# Patient Record
Sex: FEMALE | Race: WHITE | NOT HISPANIC OR LATINO | Employment: FULL TIME | URBAN - METROPOLITAN AREA
[De-identification: names, ages, dates, MRNs, and addresses within clinical notes are randomized per-mention and may not be internally consistent; named-entity substitution may affect disease eponyms.]

---

## 2017-08-25 ENCOUNTER — TELEPHONE (OUTPATIENT)
Dept: NEUROSURGERY | Facility: CLINIC | Age: 42
End: 2017-08-25

## 2017-08-25 NOTE — TELEPHONE ENCOUNTER
----- Message from Jon Tello sent at 8/25/2017 11:44 AM CDT -----  Contact: Patient @ [phone]  Patient is calling to schedule a f/u appt she's having pain where the shunt is and her blood pressure keeps dropping, pls call

## 2017-08-29 ENCOUNTER — TELEPHONE (OUTPATIENT)
Dept: NEUROSURGERY | Facility: CLINIC | Age: 42
End: 2017-08-29

## 2017-08-29 NOTE — TELEPHONE ENCOUNTER
Patient called to say she lives 2 hours away and cant make it today d/t weather. I advised her of the soonest available appt of 09/14/2017 1240 with catrachito hernandez. I had made this appt so fast bc she is having shunt pain with pressure dropping to 70/50s. Im not sure if it is shunt related but she said her cardiologist said it is related to the shunt. I advised her if she feels bad, dizzy or faint then proceed to the ED in the meantime

## 2017-08-30 ENCOUNTER — TELEPHONE (OUTPATIENT)
Dept: NEUROSURGERY | Facility: CLINIC | Age: 42
End: 2017-08-30

## 2017-08-30 NOTE — TELEPHONE ENCOUNTER
----- Message from Mary Olea sent at 8/29/2017  8:13 AM CDT -----  Contact: Patient [phone]  Patient is calling to ask nurse if there is another appt date available and to also ask a few questions about her shunt. Please call

## 2017-09-14 ENCOUNTER — OFFICE VISIT (OUTPATIENT)
Dept: NEUROSURGERY | Facility: CLINIC | Age: 42
End: 2017-09-14
Payer: MEDICAID

## 2017-09-14 VITALS
WEIGHT: 115 LBS | HEIGHT: 60 IN | TEMPERATURE: 98 F | DIASTOLIC BLOOD PRESSURE: 75 MMHG | SYSTOLIC BLOOD PRESSURE: 108 MMHG | BODY MASS INDEX: 22.58 KG/M2 | HEART RATE: 95 BPM

## 2017-09-14 DIAGNOSIS — G93.89 CEREBRAL VENTRICULOMEGALY: Primary | ICD-10-CM

## 2017-09-14 DIAGNOSIS — R51.9 CHRONIC NONINTRACTABLE HEADACHE, UNSPECIFIED HEADACHE TYPE: Primary | ICD-10-CM

## 2017-09-14 DIAGNOSIS — G89.29 CHRONIC NONINTRACTABLE HEADACHE, UNSPECIFIED HEADACHE TYPE: Primary | ICD-10-CM

## 2017-09-14 PROCEDURE — 99999 PR PBB SHADOW E&M-EST. PATIENT-LVL III: CPT | Mod: PBBFAC,,, | Performed by: PHYSICIAN ASSISTANT

## 2017-09-14 PROCEDURE — 99213 OFFICE O/P EST LOW 20 MIN: CPT | Mod: PBBFAC | Performed by: PHYSICIAN ASSISTANT

## 2017-09-14 PROCEDURE — 99213 OFFICE O/P EST LOW 20 MIN: CPT | Mod: S$PBB,,, | Performed by: PHYSICIAN ASSISTANT

## 2017-09-14 RX ORDER — IBUPROFEN 800 MG/1
800 TABLET ORAL
COMMUNITY
Start: 2017-08-10

## 2017-09-14 RX ORDER — CLONAZEPAM 0.5 MG/1
0.25 TABLET ORAL
COMMUNITY
Start: 2015-12-30 | End: 2019-08-13

## 2017-09-14 RX ORDER — ESTRADIOL 0.25 MG/.25G
GEL TOPICAL
Refills: 0 | COMMUNITY
Start: 2017-07-26

## 2017-09-14 NOTE — PROGRESS NOTES
[facility]  Neurosurgery    SUBJECTIVE:     History of Present Illness:  Patient is a 42 y.o. female with PMHx VPS originally placed at [facility] in  for ventriculmegaly by Dr. Mcintosh, who presents for continued neurosurgical follow up.  She was last seen by Dr. Pennington in clinic on 3/3/16 and her Codman Hakim shunt was adjusted to 170.  She has a history of migraines & tension HAs as well and is followed by Dr. Bolanos with neurology in [place].  She reports that approximately 2 weeks ago, she began having significant tenderness at her shunt site, but denies erythema, fever, drainage.  She also began having severe headaches, which prompted her to go to the ED at [place].  A CTH & shunt series were done, which revealed no changes or abnormalities.  The severe headaches have now resolved and she continues to have her normal headaches.  She has nausea, but has had this for years & it is unchanged.  She has no vomiting.  She does have intermittent paresthesias in all limbs and this has been evaluated by her neurologist with [facility], but MS was ruled out.  She denies any vision changes.      Review of patient's allergies indicates:   Allergen Reactions    Butorphanol tartrate     Metoclopramide hcl     Prochlorperazine edisylate     Promethazine     Hydrocodone-acetaminophen Nausea And Vomiting and Rash       History reviewed. No pertinent past medical history.  Past Surgical History:   Procedure Laterality Date    APPENDECTOMY       SECTION      CHOLECYSTECTOMY      HYSTERECTOMY  2016    VENTRICULOPERITONEAL SHUNT       History reviewed. No pertinent family history.  Social History   Substance Use Topics    Smoking status: Never Smoker    Smokeless tobacco: Never Used    Alcohol use No        Review of Systems:  Constitutional: no fever or chills  Eyes: no visual changes  ENT: no nasal congestion or sore throat  Respiratory: no cough or shortness of breath  Cardiovascular: no chest  pain or palpitations  Gastrointestinal: no nausea or vomiting, tolerating diet  Genitourinary: no hematuria or dysuria  Integument/Breast: no rash or pruritis  Hematologic/Lymphatic: no easy bruising or lymphadenopathy  Musculoskeletal: no arthralgias or myalgias  Neurological: no seizures or tremors, positive for headaches  Behavioral/Psych: no auditory or visual hallucinations  Endocrine: no heat or cold intolerance    OBJECTIVE:     Vital Signs (Most Recent):  Temp: 98.4 °F (36.9 °C) (09/14/17 1240)  Pulse: 95 (09/14/17 1240)  BP: 108/75 (09/14/17 1240)    Physical Exam:  General: well developed, well nourished, no distress  Head: normocephalic, atraumatic  Neurologic: Alert and oriented. Thought content appropriate  GCS: Motor: 6/Verbal: 5/Eyes: 4 GCS Total: 15  Mental Status: Awake, Alert, Oriented x 4  Language: No aphasia  Speech: No dysarthria  Cranial nerves: face symmetric, tongue midline, CN II-XII grossly intact.   Eyes: pupils equal, round, reactive to light with accommodation, EOMI. Unable to appreciate optic disc. Red reflex intact bilaterally.   Pulmonary: normal respirations, not labored, no accessory muscles used  Abdomen: soft, non-distended, not tender to palpation  Sensory: intact to light touch throughout  Motor Strength: Moves all extremities spontaneously with good tone.  Full strength upper and lower extremities. No abnormal movements seen.     Strength  Deltoids Triceps Biceps Wrist Extension Wrist Flexion Hand    Upper: R 5/5 5/5 5/5 5/5 5/5 5/5    L 5/5 5/5 5/5 5/5 5/5 5/5     Iliopsoas Quadriceps Knee  Flexion Tibialis  anterior Gastro- cnemius EHL   Lower: R 5/5 5/5 5/5 5/5 5/5 5/5    L 5/5 5/5 5/5 5/5 5/5 5/5     DTR's - 2 + and symmetric triceps, biceps, brachioradialis, patellar, & achilles  Pronator Drift: no drift noted  Finger-to-nose: Intact bilaterally  Moreno: absent  Clonus: absent  Babinski: absent  Pulses: 2+ and symmetric radial and dorsalis pedis  Skin: warm, dry and  intact, no rashes  Shunt reservoir pumps and refills slowly    Diagnostic Results:  I personally reviewed [facility] CTH & Shunt series    ASSESSMENT/PLAN:     Patient is a 42 y.o. female with PMHx VPS originally placed at [facility] in 2003 for ventriculmegaly by Dr. Mcintosh, who presents for continued neurosurgical follow up of headaches  -Neurologically intact  -No evidence of shunt infection  -[facility] imaging with stable placement of shunt and small ventricles, shunt series shows no concern for discontinuation or kinking of tubing  -Adjusted shunt today from 170 to 190  -Will have patient follow up in 2 weeks to evaluate if any changes in headaches  -Encouraged patient to call with any new or worsening symptoms in the interim    Please feel free to call with any further questions    Latha Muro PA-C  Neurosurgery  [phone]

## 2018-01-10 ENCOUNTER — TELEPHONE (OUTPATIENT)
Dept: NEUROSURGERY | Facility: CLINIC | Age: 43
End: 2018-01-10

## 2018-01-10 NOTE — TELEPHONE ENCOUNTER
----- Message from Jon Tello sent at 1/10/2018  2:17 PM CST -----  Contact: Patient @ [phone]  Patient is calling to schedule a f/u with Dr. Pennington not the PA to check her shunt, she had a shunt series and CT done within a month,  pls call

## 2018-01-16 ENCOUNTER — OFFICE VISIT (OUTPATIENT)
Dept: NEUROSURGERY | Facility: CLINIC | Age: 43
End: 2018-01-16
Payer: MEDICAID

## 2018-01-16 VITALS
WEIGHT: 118.88 LBS | BODY MASS INDEX: 23.34 KG/M2 | HEART RATE: 76 BPM | DIASTOLIC BLOOD PRESSURE: 78 MMHG | SYSTOLIC BLOOD PRESSURE: 121 MMHG | HEIGHT: 60 IN | TEMPERATURE: 98 F

## 2018-01-16 DIAGNOSIS — R42 VERTIGO: ICD-10-CM

## 2018-01-16 DIAGNOSIS — G91.9 HYDROCEPHALUS: ICD-10-CM

## 2018-01-16 DIAGNOSIS — Z98.2 S/P VP SHUNT: ICD-10-CM

## 2018-01-16 PROCEDURE — 99999 PR PBB SHADOW E&M-EST. PATIENT-LVL III: CPT | Mod: PBBFAC,,, | Performed by: PHYSICIAN ASSISTANT

## 2018-01-16 PROCEDURE — 99213 OFFICE O/P EST LOW 20 MIN: CPT | Mod: PBBFAC | Performed by: PHYSICIAN ASSISTANT

## 2018-01-16 PROCEDURE — 99214 OFFICE O/P EST MOD 30 MIN: CPT | Mod: S$PBB,,, | Performed by: PHYSICIAN ASSISTANT

## 2018-01-16 RX ORDER — ONDANSETRON 8 MG/1
8 TABLET, ORALLY DISINTEGRATING ORAL 2 TIMES DAILY
Refills: 0 | COMMUNITY
Start: 2017-11-03 | End: 2019-08-13

## 2018-01-16 NOTE — LETTER
January 16, 2018      Adan Kim, LARRY  [address] [address]  [address]  [facility]  [place] [address]           [facility] - Neurosurgery 7th Fl  [address]  [facility] [address]  Phone: [phone]          Patient: Esther Dominique   MR Number: [id number]   YOB: 1975   Date of Visit: 1/16/2018       Dear Adan Kim:    Thank you for referring Esther Dominique to me for evaluation. Attached you will find relevant portions of my assessment and plan of care.    If you have questions, please do not hesitate to call me. I look forward to following Esther Dominique along with you.    Sincerely,    Chantell Castañeda PA-C    Enclosure  CC:  No Recipients    If you would like to receive this communication electronically, please contact [email or web address] or [phone] to request more information on [employer] access.    For providers and/or their staff who would like to refer a patient to [facility], please contact us through our one-stop-shop provider referral line, Caro Davis, at [phone].    If you feel you have received this communication in error or would no longer like to receive these types of communications, please e-mail [email or web address]

## 2018-01-16 NOTE — PROGRESS NOTES
[facility] - Neurosurgery 7th Fl  Neurosurgery    SUBJECTIVE:     History of Present Illness:  Esther Dominique is a 42 y.o. female with PMHx VPS originally placed at [facility] in  for ventriculmegaly by Dr. Mcintosh, who presents for continued neurosurgical follow up. She was last seen by Latha Muro PA-C 2017, at which time her shunt was dialed from 170 to 190 for continued HAs. Today, she states she has done well at this setting, with improvement in her HAs. However, she is concerned that her shunt is draining externally. She reports a damp feeling at her shunt site occasionally. She also states that she can feel her shunt draining intracranially. Additionally, she reports decreased balance when standing still with eyes closed. She denies worsened HAs, n/v, fever, chills. She does have a history of chronic sinusitis and has recently finished a course of abx for sinus infection.     Review of patient's allergies indicates:   Allergen Reactions    Butorphanol tartrate     Metoclopramide hcl     Prochlorperazine edisylate     Promethazine     Hydrocodone-acetaminophen Nausea And Vomiting and Rash       No past medical history on file.    Past Surgical History:   Procedure Laterality Date    APPENDECTOMY       SECTION      CHOLECYSTECTOMY      HYSTERECTOMY  2016    VENTRICULOPERITONEAL SHUNT          No family history on file.    Social History     Social History    Marital status: Single     Spouse name: N/A    Number of children: N/A    Years of education: N/A     Occupational History    Not on file.     Social History Main Topics    Smoking status: Never Smoker    Smokeless tobacco: Never Used    Alcohol use No    Drug use: No    Sexual activity: Yes     Partners: Male     Other Topics Concern    Not on file     Social History Narrative    No narrative on file       Review of Systems:  Constitutional: no fever, chills or night sweats. No changes in weight   Eyes: no visual  changes   ENT: no nasal congestion or sore throat   Respiratory: no cough or shortness of breath   Cardiovascular: no chest pain or palpitations   Gastrointestinal: no nausea or vomiting   Genitourinary: no hematuria or dysuria   Integument/Breast: no rash or pruritis   Hematologic/Lymphatic: no easy bruising or lymphadenopathy   Musculoskeletal: no arthralgias or myalgias.   Neurological: no seizures or tremors   Behavioral/Psych: no auditory or visual hallucinations   Endocrine: no heat or cold intolerance     OBJECTIVE:     Vital Signs (Most Recent):  Vitals:    01/16/18 1111   BP: 121/78   Pulse: 76   Temp: 97.7 °F (36.5 °C)   Weight: 53.9 kg (118 lb 14.4 oz)   Height: 5' (1.524 m)       Physical Exam:  General: well developed, well nourished, no distress.   Head: normocephalic, atraumatic  Neurologic: Alert and oriented. Thought content appropriate.  GCS: Motor: 6/Verbal: 5/Eyes: 4 GCS Total: 15  Mental Status: Awake, Alert, Oriented x 4  Language: No aphasia  Speech: No dysarthria  Cranial nerves: face symmetric, tongue midline, CN II-XII grossly intact.   Eyes: pupils equal, round, reactive to light with accomodation, EOMI.  Pulmonary: normal respirations, no signs of respiratory distress  Abdomen: soft, non-distended, not tender to palpation  Sensory: intact to light touch throughout    Motor Strength: Moves all extremities spontaneously with good tone.  Full strength upper and lower extremities. No abnormal movements seen.     DTR's: 2 + and symmetric in UE and LE  Pronator Drift: no drift noted  Finger-to-nose: Intact bilaterally  Moreno: absent  Clonus: absent  Babinski: absent  Pulses: 2+ and symmetric radial and dorsalis pedis.  Skin: Skin is warm, dry and intact.  Gait: normal   + Romberg  No evidence of skin breakdown at shunt site. Unable to express drainage. VPS pumps, refills.    Diagnostic Results:  CT head and shunt series reviewed and show appropriate placement of proximal and distal catheters  without acute complication or discontinuity.    ASSESSMENT/PLAN:     Esther Dominique is a 42 y.o. female s/p VPS in 2003 who presents for concerns of VPS malfunction. I see no radiographic or clinical evidence for VPS malfunction at this time. I believe her dizziness and sensation of drainage may be attributed to sinusitis vs vertigo, and have instructed her to follow up with her PCP for this. She knows to contact me with any new or worsening symptoms. Otherwise, she may follow up in one year.      Chantell Castañeda PA-C  Neurosurgery

## 2019-04-29 ENCOUNTER — TELEPHONE (OUTPATIENT)
Dept: NEUROSURGERY | Facility: CLINIC | Age: 44
End: 2019-04-29

## 2019-04-29 NOTE — TELEPHONE ENCOUNTER
Wilner Pt about getting an appointment early the next available was 08/13/19@3:30pm Pt stated she will try to go to her NP  And see if she can be seen before the available date for  and scans for appointment I ask her to call the office if she gets Imaging before  's F/U

## 2019-06-03 ENCOUNTER — TELEPHONE (OUTPATIENT)
Dept: NEUROSURGERY | Facility: CLINIC | Age: 44
End: 2019-06-03

## 2019-06-03 NOTE — TELEPHONE ENCOUNTER
Sw the Pt about her wanting a sooner appt. I explained to the patient that her date and time would be the next available on the 8/13/19 also instructed the patient if she feels it is very severe she needs to go the ER foe evail asap! Patient understood the stated

## 2019-08-13 ENCOUNTER — OFFICE VISIT (OUTPATIENT)
Dept: NEUROSURGERY | Facility: CLINIC | Age: 44
End: 2019-08-13

## 2019-08-13 ENCOUNTER — TELEPHONE (OUTPATIENT)
Dept: NEUROSURGERY | Facility: CLINIC | Age: 44
End: 2019-08-13

## 2019-08-13 VITALS
SYSTOLIC BLOOD PRESSURE: 124 MMHG | WEIGHT: 118.31 LBS | HEIGHT: 61 IN | DIASTOLIC BLOOD PRESSURE: 60 MMHG | HEART RATE: 92 BPM | BODY MASS INDEX: 22.34 KG/M2

## 2019-08-13 DIAGNOSIS — Z98.2 S/P VP SHUNT: Primary | ICD-10-CM

## 2019-08-13 PROCEDURE — 99214 PR OFFICE/OUTPT VISIT, EST, LEVL IV, 30-39 MIN: ICD-10-PCS | Mod: S$PBB,,, | Performed by: NEUROLOGICAL SURGERY

## 2019-08-13 PROCEDURE — 99999 PR PBB SHADOW E&M-EST. PATIENT-LVL III: CPT | Mod: PBBFAC,,, | Performed by: NEUROLOGICAL SURGERY

## 2019-08-13 PROCEDURE — 99214 OFFICE O/P EST MOD 30 MIN: CPT | Mod: S$PBB,,, | Performed by: NEUROLOGICAL SURGERY

## 2019-08-13 PROCEDURE — 99999 PR PBB SHADOW E&M-EST. PATIENT-LVL III: ICD-10-PCS | Mod: PBBFAC,,, | Performed by: NEUROLOGICAL SURGERY

## 2019-08-13 PROCEDURE — 99213 OFFICE O/P EST LOW 20 MIN: CPT | Mod: PBBFAC | Performed by: NEUROLOGICAL SURGERY

## 2019-08-13 RX ORDER — METHOCARBAMOL 500 MG/1
TABLET, FILM COATED ORAL
Refills: 1 | COMMUNITY
Start: 2019-06-22

## 2019-08-13 RX ORDER — ONDANSETRON 4 MG/1
4 TABLET, ORALLY DISINTEGRATING ORAL EVERY 6 HOURS PRN
COMMUNITY
Start: 2018-04-08

## 2019-08-13 RX ORDER — CLONAZEPAM 0.5 MG/1
TABLET ORAL
COMMUNITY

## 2019-08-13 NOTE — PROGRESS NOTES
The patient comes back to see me for follow-up for her  shunt.  She we last saw her back in 2016.  She had a shunt placed by Dr. Mcintosh for nonspecific headaches.  Last time we saw in 2016 she has slit ventricles and we were under impressed with sure nonspecific headache complaints.  She is now back with some new headaches symptoms of mainly neck pain.  She denies any signs or symptoms concerning for shunt infection.    Exam today she is awake alert appropriate.  Her pupils equal reactive.  Her extraocular months appeared to be full.  She has no focal deficits no weakness no numbness.  The shunt pumps and refills easily.  I am able to palpate the tubing from the head all the way down to the abdomen.  Is no redness or swelling.    She had a CT scan done at outside facility in May 2019 which shows a right occipital parietal shunt with slit ventricles unchanged 2016.  She said she had an shunt series was done but was not burn on this CT scan.    Overall I am unimpressive this is a shunt problem I worry that her neck symptoms are more musculoskeletal.  I advised conservative management with anti-inflammatories and if she has persistent neck pain potentially get an MRI scan.  The shunt looks like is working just fine a reassured her definitely no evidence of a shunt problem at this stage.  Which is planned this year every other year with a CT scan shunt series.

## 2019-08-13 NOTE — TELEPHONE ENCOUNTER
----- Message from Marjan Gutierrez sent at 8/13/2019 12:49 PM CDT -----  Contact: pt@ [phone]  Caller is asking to reschedule her appt with Dr. Pennington, but wants to know the next available before the change is done, I found no available appts in the system. Please call.

## 2019-08-13 NOTE — TELEPHONE ENCOUNTER
Returned the phone call back and ask the patient would she be able to come to the appt.she stated she would but there was bad weather headed to them I ask her to call if we need to reschedule the visit the patient understood the stated

## 2023-04-03 ENCOUNTER — TELEPHONE (OUTPATIENT)
Dept: NEUROSURGERY | Facility: CLINIC | Age: 48
End: 2023-04-03
Payer: COMMERCIAL

## 2023-04-03 DIAGNOSIS — Z98.2 S/P VP SHUNT: Primary | ICD-10-CM

## 2023-04-04 ENCOUNTER — TELEPHONE (OUTPATIENT)
Dept: ADMINISTRATIVE | Facility: OTHER | Age: 48
End: 2023-04-04
Payer: COMMERCIAL

## 2023-04-18 ENCOUNTER — TELEPHONE (OUTPATIENT)
Dept: NEUROSURGERY | Facility: CLINIC | Age: 48
End: 2023-04-18
Payer: COMMERCIAL

## 2023-04-18 NOTE — TELEPHONE ENCOUNTER
----- Message from SecurusanisaLeho Route sent at 4/18/2023  3:02 PM CDT -----  Regarding: Pt. Feels Shunt Draining in Her Head  Contact: pt.[phone]  Pt. Is requesting  sooner appt. States she feels the shunt draining in the top of her head and she doesn't usually feel it. Pt has appt for CT Scan and Xray on 5/9 and want those sooner also. Patient Requesting Call Back @ pt.[phone]

## 2023-04-18 NOTE — TELEPHONE ENCOUNTER
Patient said she can feel the shunt draining a lot, she has never been able to feel that. She is not having any headaches, N/V, confusion, or lethargy. I advised we had no sooner availability but if she starts to have any of theses symptoms then come here to the ED to be evaluated sooner

## 2023-05-09 ENCOUNTER — HOSPITAL ENCOUNTER (OUTPATIENT)
Dept: RADIOLOGY | Facility: HOSPITAL | Age: 48
Discharge: HOME | End: 2023-05-09
Attending: NEUROLOGICAL SURGERY
Payer: COMMERCIAL

## 2023-05-09 ENCOUNTER — OFFICE VISIT (OUTPATIENT)
Dept: NEUROSURGERY | Facility: CLINIC | Age: 48
End: 2023-05-09
Payer: COMMERCIAL

## 2023-05-09 VITALS
SYSTOLIC BLOOD PRESSURE: 117 MMHG | BODY MASS INDEX: 22.28 KG/M2 | HEART RATE: 102 BPM | DIASTOLIC BLOOD PRESSURE: 83 MMHG | HEIGHT: 61 IN | WEIGHT: 118 LBS

## 2023-05-09 DIAGNOSIS — Z98.2 S/P VP SHUNT: ICD-10-CM

## 2023-05-09 DIAGNOSIS — Z98.2 S/P VP SHUNT: Primary | ICD-10-CM

## 2023-05-09 PROCEDURE — 74018 XR SHUNT SERIES: ICD-10-PCS | Mod: 26,,, | Performed by: RADIOLOGY

## 2023-05-09 PROCEDURE — 99204 OFFICE O/P NEW MOD 45 MIN: CPT | Mod: S$GLB,,, | Performed by: PHYSICIAN ASSISTANT

## 2023-05-09 PROCEDURE — 74018 RADEX ABDOMEN 1 VIEW: CPT | Mod: 26,,, | Performed by: RADIOLOGY

## 2023-05-09 PROCEDURE — 71045 X-RAY EXAM CHEST 1 VIEW: CPT | Mod: TC

## 2023-05-09 PROCEDURE — 3074F PR MOST RECENT SYSTOLIC BLOOD PRESSURE < 130 MM HG: ICD-10-PCS | Mod: CPTII,S$GLB,, | Performed by: PHYSICIAN ASSISTANT

## 2023-05-09 PROCEDURE — 70250 XR SHUNT SERIES: ICD-10-PCS | Mod: 26,,, | Performed by: RADIOLOGY

## 2023-05-09 PROCEDURE — 99999 PR PBB SHADOW E&M-EST. PATIENT-LVL III: CPT | Mod: PBBFAC,,, | Performed by: PHYSICIAN ASSISTANT

## 2023-05-09 PROCEDURE — 3008F BODY MASS INDEX DOCD: CPT | Mod: CPTII,S$GLB,, | Performed by: PHYSICIAN ASSISTANT

## 2023-05-09 PROCEDURE — 99204 PR OFFICE/OUTPT VISIT, NEW, LEVL IV, 45-59 MIN: ICD-10-PCS | Mod: S$GLB,,, | Performed by: PHYSICIAN ASSISTANT

## 2023-05-09 PROCEDURE — 3008F PR BODY MASS INDEX (BMI) DOCUMENTED: ICD-10-PCS | Mod: CPTII,S$GLB,, | Performed by: PHYSICIAN ASSISTANT

## 2023-05-09 PROCEDURE — 72020 XR SHUNT SERIES: ICD-10-PCS | Mod: 26,,, | Performed by: RADIOLOGY

## 2023-05-09 PROCEDURE — 1159F MED LIST DOCD IN RCRD: CPT | Mod: CPTII,S$GLB,, | Performed by: PHYSICIAN ASSISTANT

## 2023-05-09 PROCEDURE — 3079F PR MOST RECENT DIASTOLIC BLOOD PRESSURE 80-89 MM HG: ICD-10-PCS | Mod: CPTII,S$GLB,, | Performed by: PHYSICIAN ASSISTANT

## 2023-05-09 PROCEDURE — 70250 X-RAY EXAM OF SKULL: CPT | Mod: 26,,, | Performed by: RADIOLOGY

## 2023-05-09 PROCEDURE — 70450 CT HEAD WITHOUT CONTRAST: ICD-10-PCS | Mod: 26,,, | Performed by: RADIOLOGY

## 2023-05-09 PROCEDURE — 72020 X-RAY EXAM OF SPINE 1 VIEW: CPT | Mod: 26,,, | Performed by: RADIOLOGY

## 2023-05-09 PROCEDURE — 70450 CT HEAD/BRAIN W/O DYE: CPT | Mod: 26,,, | Performed by: RADIOLOGY

## 2023-05-09 PROCEDURE — 71045 XR SHUNT SERIES: ICD-10-PCS | Mod: 26,,, | Performed by: RADIOLOGY

## 2023-05-09 PROCEDURE — 71045 X-RAY EXAM CHEST 1 VIEW: CPT | Mod: 26,,, | Performed by: RADIOLOGY

## 2023-05-09 PROCEDURE — 1159F PR MEDICATION LIST DOCUMENTED IN MEDICAL RECORD: ICD-10-PCS | Mod: CPTII,S$GLB,, | Performed by: PHYSICIAN ASSISTANT

## 2023-05-09 PROCEDURE — 3079F DIAST BP 80-89 MM HG: CPT | Mod: CPTII,S$GLB,, | Performed by: PHYSICIAN ASSISTANT

## 2023-05-09 PROCEDURE — 70450 CT HEAD/BRAIN W/O DYE: CPT | Mod: TC

## 2023-05-09 PROCEDURE — 1160F PR REVIEW ALL MEDS BY PRESCRIBER/CLIN PHARMACIST DOCUMENTED: ICD-10-PCS | Mod: CPTII,S$GLB,, | Performed by: PHYSICIAN ASSISTANT

## 2023-05-09 PROCEDURE — 99999 PR PBB SHADOW E&M-EST. PATIENT-LVL III: ICD-10-PCS | Mod: PBBFAC,,, | Performed by: PHYSICIAN ASSISTANT

## 2023-05-09 PROCEDURE — 1160F RVW MEDS BY RX/DR IN RCRD: CPT | Mod: CPTII,S$GLB,, | Performed by: PHYSICIAN ASSISTANT

## 2023-05-09 PROCEDURE — 3074F SYST BP LT 130 MM HG: CPT | Mod: CPTII,S$GLB,, | Performed by: PHYSICIAN ASSISTANT

## 2023-05-09 RX ORDER — AMOXICILLIN AND CLAVULANATE POTASSIUM 875; 125 MG/1; MG/1
1 TABLET, FILM COATED ORAL 2 TIMES DAILY
COMMUNITY
Start: 2023-04-27

## 2023-05-09 RX ORDER — PANTOPRAZOLE SODIUM 40 MG/1
40 TABLET, DELAYED RELEASE ORAL
COMMUNITY
Start: 2022-12-21

## 2023-05-09 RX ORDER — PSEUDOEPHEDRINE HCL 30 MG
30 TABLET ORAL EVERY 6 HOURS PRN
COMMUNITY
Start: 2023-04-18

## 2023-05-09 NOTE — PROGRESS NOTES
Neurosurgery  New Patient    SUBJECTIVE:     CC:  shunt status    History of Present Illness:  48 yo F with h/o  shunt placed by Dr. Refugio Mcintosh for nonspecific headaches in 2016.  Patient last seen by Dr. Pennington in 2019, at that time she was scheduled for follow-up for every other year.  She presents today for routine follow-up.  Denies any headaches, visual changes, weakness, balance difficulty, seizures.  Denies any recent trauma.  She states overall she is been doing well without any complaints.       Review of patient's allergies indicates:   Allergen Reactions    Butorphanol tartrate      Other reaction(s): vomiting    Metoclopramide hcl      Other reaction(s): jerking    Prochlorperazine edisylate      Other reaction(s): muscle spasms    Promethazine     Promethazine hcl     Hydrocodone-acetaminophen Nausea And Vomiting and Rash       Current Outpatient Medications   Medication Sig Dispense Refill    amoxicillin-clavulanate 875-125mg (AUGMENTIN) 875-125 mg per tablet Take 1 tablet by mouth 2 (two) times daily.      ibuprofen (ADVIL,MOTRIN) 800 MG tablet Take 800 mg by mouth.      pseudoephedrine (SUDAFED) 30 MG tablet Take 30 mg by mouth every 6 (six) hours as needed.      clonazePAM (KLONOPIN) 0.5 MG tablet Take by mouth.      DIVIGEL 0.25 mg/0.25 gram (0.1 %) GlPk SURAJ 1 PACKET EXTERNALLY TO SPECIFIC AREA OF SKIN D  0    methocarbamol (ROBAXIN) 500 MG Tab TK 1 T PO  BID  1    ondansetron (ZOFRAN-ODT) 4 MG TbDL Take 4 mg by mouth every 6 (six) hours as needed.      pantoprazole (PROTONIX) 40 MG tablet Take 40 mg by mouth.      PROGESTERONE VAGL Place vaginally.       No current facility-administered medications for this visit.       History reviewed. No pertinent past medical history.  Past Surgical History:   Procedure Laterality Date    APPENDECTOMY       SECTION      CHOLECYSTECTOMY      HYSTERECTOMY  2016    VENTRICULOPERITONEAL SHUNT       Family History    None       Social History  "    Socioeconomic History    Marital status:    Tobacco Use    Smoking status: Never    Smokeless tobacco: Never   Substance and Sexual Activity    Alcohol use: No     Alcohol/week: 0.0 standard drinks    Drug use: No    Sexual activity: Yes     Partners: Male       Review of Systems  Constitutional: no fever or chills  Eyes: no visual changes  ENT: no nasal congestion or sore throat  Respiratory: no cough or shortness of breath  Cardiovascular: no chest pain or palpitations  Gastrointestinal: no nausea or vomiting  Genitourinary: no hematuria or dysuria  Integument/Breast: no rash or pruritis  Hematologic/Lymphatic: no easy bruising or lymphadenopathy  Musculoskeletal: no arthralgias or myalgias  Neurological: no seizures or tremors    OBJECTIVE:     Vital Signs  Pulse: 102  BP: 117/83  Pain Score: 0-No pain  Height: 5' 1" (154.9 cm)  Weight: 53.5 kg (118 lb)  Body mass index is 22.3 kg/m².    Neurosurgery Physical Exam  General: no distress  Neurologic: Alert and oriented. Thought content appropriate.  Head: normocephalic  GCS: Motor: 6/Verbal: 5/Eyes: 4 GCS Total: 15  Cranial nerves: face symmetric, tongue midline, pupils equal, round, reactive to light with accomodation, extraocular muscles intact  Sensory: response to light touch throughout  Motor Strength:full strength upper and lower extremities  Pronator Drift: no drift noted  Lungs:  normal respiratory effort  Extremities: no cyanosis or edema, or clubbing  Gait normal    Diagnostic Results: personally reviewed  EXAMINATION:  CT HEAD WITHOUT CONTRAST     CLINICAL HISTORY:  shunt; Presence of cerebrospinal fluid drainage device     TECHNIQUE:  Low dose axial CT images obtained throughout the head without the use of intravenous contrast.  Axial, sagittal and coronal reconstructions were performed.     COMPARISON:  12/24/2017     FINDINGS:  Intracranial compartment:     Right-sided ventricular shunt in place.  Tip of the catheter in stable position.  " Ventricles are stable in size, with 3rd ventricle diameter again measuring on the order of 0.3 cm.  Cavum septum pellucidum.  Stable sulcal definition over the cerebral convexities.     The brain parenchyma appears unchanged.   No new parenchymal mass, hemorrhage, edema or major vascular distribution infarct.     No new extra-axial blood or fluid collections.     Skull/extracranial contents (limited evaluation):     No fracture. Mastoid air cells and paranasal sinuses are essentially clear.     Impression:     Ventricular shunt in place with stable size and configuration of the ventricles as above.        Electronically signed by: Jere Ansari MD  Date:                                            05/09/2023  Time:                                           13:33    ASSESSMENT/PLAN:     48 yo F with h/o  shunt placed by Dr. Refugio Mcintosh for nonspecific headaches in 2016.  HCT shows stable configuration of ventricular system without acute abnormality.  Shunt series shows continuous tubing throughout. No evidence of shunt failure at this time. Patient is neurologically intact and currently asymptomatic.  We will continue to follow every other year with head CT and shunt series.  They were encouraged to call the clinic with questions or concerns the meantime.    Santhosh Hall Jr. JOSELYN  [facility]  Department of Neurosurgery      Note dictated with voice recognition software, please excuse any grammatical errors.